# Patient Record
Sex: MALE | Race: WHITE | URBAN - METROPOLITAN AREA
[De-identification: names, ages, dates, MRNs, and addresses within clinical notes are randomized per-mention and may not be internally consistent; named-entity substitution may affect disease eponyms.]

---

## 2022-09-01 ENCOUNTER — APPOINTMENT (RX ONLY)
Dept: URBAN - METROPOLITAN AREA CLINIC 28 | Facility: CLINIC | Age: 72
Setting detail: DERMATOLOGY
End: 2022-09-01

## 2022-09-01 DIAGNOSIS — B35.3 TINEA PEDIS: ICD-10-CM | Status: INADEQUATELY CONTROLLED

## 2022-09-01 DIAGNOSIS — L30.1 DYSHIDROSIS [POMPHOLYX]: ICD-10-CM | Status: INADEQUATELY CONTROLLED

## 2022-09-01 PROCEDURE — ? ADDITIONAL NOTES

## 2022-09-01 PROCEDURE — ? COUNSELING

## 2022-09-01 PROCEDURE — 99204 OFFICE O/P NEW MOD 45 MIN: CPT

## 2022-09-01 PROCEDURE — ? PRESCRIPTION

## 2022-09-01 RX ORDER — CLOBETASOL PROPIONATE 0.5 MG/G
CREAM TOPICAL BID
Qty: 60 | Refills: 2 | Status: ERX | COMMUNITY
Start: 2022-09-01

## 2022-09-01 RX ORDER — KETOCONAZOLE 20 MG/G
CREAM TOPICAL BID
Qty: 60 | Refills: 5 | Status: ERX | COMMUNITY
Start: 2022-09-01

## 2022-09-01 RX ADMIN — KETOCONAZOLE: 20 CREAM TOPICAL at 00:00

## 2022-09-01 RX ADMIN — CLOBETASOL PROPIONATE: 0.5 CREAM TOPICAL at 00:00

## 2022-09-01 ASSESSMENT — LOCATION DETAILED DESCRIPTION DERM
LOCATION DETAILED: LEFT MEDIAL PLANTAR MIDFOOT
LOCATION DETAILED: RIGHT PLANTAR FOREFOOT OVERLYING 2ND METATARSAL
LOCATION DETAILED: LEFT PLANTAR FOREFOOT OVERLYING 2ND METATARSAL
LOCATION DETAILED: RIGHT MEDIAL PLANTAR MIDFOOT

## 2022-09-01 ASSESSMENT — LOCATION SIMPLE DESCRIPTION DERM
LOCATION SIMPLE: LEFT PLANTAR SURFACE
LOCATION SIMPLE: RIGHT PLANTAR SURFACE

## 2022-09-01 ASSESSMENT — LOCATION ZONE DERM: LOCATION ZONE: FEET

## 2022-09-02 ENCOUNTER — RX ONLY (OUTPATIENT)
Age: 72
Setting detail: RX ONLY
End: 2022-09-02

## 2022-09-02 RX ORDER — TRIAMCINOLONE ACETONIDE 1 MG/G
OINTMENT TOPICAL
Qty: 80 | Refills: 1 | Status: ERX | COMMUNITY
Start: 2022-09-02

## 2022-10-06 ENCOUNTER — APPOINTMENT (RX ONLY)
Dept: URBAN - METROPOLITAN AREA CLINIC 28 | Facility: CLINIC | Age: 72
Setting detail: DERMATOLOGY
End: 2022-10-06

## 2022-10-06 DIAGNOSIS — L57.8 OTHER SKIN CHANGES DUE TO CHRONIC EXPOSURE TO NONIONIZING RADIATION: ICD-10-CM

## 2022-10-06 DIAGNOSIS — L82.1 OTHER SEBORRHEIC KERATOSIS: ICD-10-CM

## 2022-10-06 DIAGNOSIS — B35.3 TINEA PEDIS: ICD-10-CM

## 2022-10-06 DIAGNOSIS — S60 SUPERFICIAL INJURY OF WRIST, HAND AND FINGERS: ICD-10-CM

## 2022-10-06 DIAGNOSIS — L40.1 GENERALIZED PUSTULAR PSORIASIS: ICD-10-CM

## 2022-10-06 PROBLEM — S60.00XA CONTUSION OF UNSPECIFIED FINGER WITHOUT DAMAGE TO NAIL, INITIAL ENCOUNTER: Status: ACTIVE | Noted: 2022-10-06

## 2022-10-06 PROCEDURE — ? COUNSELING

## 2022-10-06 PROCEDURE — ? PRESCRIPTION MEDICATION MANAGEMENT

## 2022-10-06 PROCEDURE — 99214 OFFICE O/P EST MOD 30 MIN: CPT

## 2022-10-06 PROCEDURE — ? BENIGN DESTRUCTION

## 2022-10-06 PROCEDURE — ? MEDICARE ABN

## 2022-10-06 ASSESSMENT — LOCATION SIMPLE DESCRIPTION DERM
LOCATION SIMPLE: RIGHT HAND
LOCATION SIMPLE: LEFT FOOT
LOCATION SIMPLE: LEFT ANTERIOR NECK
LOCATION SIMPLE: RIGHT ANKLE
LOCATION SIMPLE: LEFT CHEEK

## 2022-10-06 ASSESSMENT — LOCATION ZONE DERM
LOCATION ZONE: NECK
LOCATION ZONE: FEET
LOCATION ZONE: LEG
LOCATION ZONE: FINGERNAIL
LOCATION ZONE: FACE

## 2022-10-06 ASSESSMENT — LOCATION DETAILED DESCRIPTION DERM
LOCATION DETAILED: LEFT CLAVICULAR NECK
LOCATION DETAILED: LEFT INFERIOR CENTRAL MALAR CHEEK
LOCATION DETAILED: RIGHT THUMBNAIL
LOCATION DETAILED: RIGHT ANKLE
LOCATION DETAILED: LEFT DORSAL FOOT

## 2022-10-06 NOTE — PROCEDURE: PRESCRIPTION MEDICATION MANAGEMENT
Render In Strict Bullet Format?: No
Detail Level: Zone
Initiate Treatment: Ketoconazole cream: apply a thin layer to AA of feet BID
Plan: Recommended topical steroids (triamcinolone), but pt declined
Plan: Patient declines treatment at this time.
Detail Level: Generalized
Plan: Patient to consider quitting smoking (he is thinking about it)

## 2022-10-06 NOTE — PROCEDURE: MEDICARE ABN
Procedure (Limit To 20 Characters): liquid nitrogen
Cost Of Treatment Patient Responsible For Paying?: 125.15
Reason?: Additional Information
Payment Option: Option 2: Don't Bill Medicare, patient responsible for payment. Patient cannot appeal Medicare if billed.
Reason?: May consider to be cosmetic. Benign lesion
Detail Level: Detailed

## 2022-10-06 NOTE — PROCEDURE: BENIGN DESTRUCTION
Include Z78.9 (Other Specified Conditions Influencing Health Status) As An Associated Diagnosis?: No
Detail Level: Detailed
Medical Necessity Information: It is in your best interest to select a reason for this procedure from the list below. All of these items fulfill various CMS LCD requirements except the new and changing color options.
Consent: The patient's consent was obtained including but not limited to risks of crusting, scabbing, blistering, scarring, darker or lighter pigmentary change, recurrence, incomplete removal and infection.
Medical Necessity Clause: This procedure was medically necessary because the lesions that were treated were:
Anesthesia Volume In Cc: 0.5
Post-Care Instructions: I reviewed with the patient in detail post-care instructions. Patient is to wear sunprotection, and avoid picking at any of the treated lesions. Pt may apply Vaseline to crusted or scabbing areas.

## 2024-11-14 DIAGNOSIS — M79.641 RIGHT HAND PAIN: Primary | ICD-10-CM

## 2024-11-18 ENCOUNTER — OFFICE VISIT (OUTPATIENT)
Dept: ORTHOPEDICS | Facility: CLINIC | Age: 74
End: 2024-11-18
Payer: MEDICARE

## 2024-11-18 ENCOUNTER — HOSPITAL ENCOUNTER (OUTPATIENT)
Dept: RADIOLOGY | Facility: HOSPITAL | Age: 74
Discharge: HOME | End: 2024-11-18
Attending: ORTHOPAEDIC SURGERY
Payer: MEDICARE

## 2024-11-18 DIAGNOSIS — M79.641 RIGHT HAND PAIN: ICD-10-CM

## 2024-11-18 DIAGNOSIS — M67.441 GANGLION CYST OF JOINT OF FINGER OF RIGHT HAND: Primary | ICD-10-CM

## 2024-11-18 PROCEDURE — 73130 X-RAY EXAM OF HAND: CPT | Mod: RT

## 2024-11-18 PROCEDURE — 99204 OFFICE O/P NEW MOD 45 MIN: CPT | Performed by: ORTHOPAEDIC SURGERY

## 2024-11-18 NOTE — ASSESSMENT & PLAN NOTE
I reviewed the findings in detail with Sage today.  He has a ganglion cyst.  This is benign.  It is not causing him any issues.  We will proceed with observation for now.  I did encourage him to return to the office should it become larger in size in the future and become symptomatic for him.  All of his questions were answered, and he agreed with the treatment plan.

## 2024-11-18 NOTE — PROGRESS NOTES
Subjective      Patient ID: Sage Nino is a 74 y.o. male.  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  Patient states he first noticed it 1 month ago.  He denies any trauma or inciting incident.  States it is getting slightly larger in size.  Is not painful.  His range of motion is not infected. He denies any numbness or tingling.    No past medical history on file.  No past surgical history on file.  No current outpatient medications on file.     No current facility-administered medications for this visit.     Not on File    The following have been reviewed and updated as appropriate in this visit:        Review of Systems:  A review of systems was completed and reviewed. All review of systems are negative except for what was stated in the HPI.    Objective     There were no vitals filed for this visit.  There is no height or weight on file to calculate BMI.    Physical Examination  General Head and Neck Exam: The patient is well-appearing and in no apparent distress. Cervical mobility is full in all directions.    Skin and Muscle Exam: No skin changes are noted to the upper extremities. Muscle bulk and contour are within normal limits without evidence of atrophy.    Range of Motion and Palpation Tests: Mobility is full about the elbows with flexion and extension. Forearm supination measures 85/85 degrees. Forearm pronation measures 85/85 degrees. Wrist flexion measures 75/75 degrees. Wrist extension measure 65/65 degrees. Digital flexion and extension are full. Thumb opposition is full to the bases of the small fingers bilaterally. Thumb abduction measures 5/5 in strength.    There is a 1 cm x 0.5 cm firm, compressible, mobile mass overlying the first dorsal extensor compartment at the base of the thumb CMC joint.  There is no tenderness to palpation.  There are no overlying skin changes.    No tenderness  over the thumb CMC articulations is observed. Scaphoid shift test is negative. Finkelstein's test is negative. Scapholunate and Lunotriquetral ballottement tests are negative. Ulnar snuffbox tenderness is negative. Ulnar impingement test is negative. Pisotriquetral shear test is negative. DRUJ piano key test is negative. There is no evidence of radiocarpal, midcarpal, or intercarpal joint instability with provocation.    Neurologic, Vascular, Motor and Misc Exams:  Sensation is intact to light touch in the ulnar, median, and radial nerve distributions. Tinel's testing is negative at the brachial plexus, pronator, cubital, radial, Guyon's, carpal tunnel, and dorsal radial sensory nerve. Fingers are pink and well-perfused. Capillary refill is brisk.     Motor testing is 5/5 in the deltoids, biceps, triceps, extensors, flexors, and intrinsics.    Miscellaneous testing including cubital tunnel compression, Phalen's, reverse Phalen's, Froment's, Wartenberg's, Cross Finger, Clawing are negative, bilaterally.    Imagin views of the right hand were taken reviewed today.  There are mild degenerative changes at the thumb CMC joint and severe degenerative changes at the STT joint.  There are no acute fractures or dislocations.  There is overall good alignment.    Assessment/Plan   Diagnoses and all orders for this visit:    Ganglion cyst of joint of finger of right hand (Primary)  Assessment & Plan:  I reviewed the findings in detail with Sage today.  He has a ganglion cyst.  This is benign.  It is not causing him any issues.  We will proceed with observation for now.  I did encourage him to return to the office should it become larger in size in the future and become symptomatic for him.  All of his questions were answered, and he agreed with the treatment plan.